# Patient Record
Sex: FEMALE | Race: WHITE
[De-identification: names, ages, dates, MRNs, and addresses within clinical notes are randomized per-mention and may not be internally consistent; named-entity substitution may affect disease eponyms.]

---

## 2018-04-10 ENCOUNTER — HOSPITAL ENCOUNTER (EMERGENCY)
Dept: HOSPITAL 31 - C.ER | Age: 58
Discharge: HOME | End: 2018-04-10
Payer: COMMERCIAL

## 2018-04-10 VITALS — OXYGEN SATURATION: 97 %

## 2018-04-10 VITALS
DIASTOLIC BLOOD PRESSURE: 81 MMHG | TEMPERATURE: 98.1 F | HEART RATE: 68 BPM | SYSTOLIC BLOOD PRESSURE: 113 MMHG | RESPIRATION RATE: 20 BRPM

## 2018-04-10 VITALS — BODY MASS INDEX: 30.4 KG/M2

## 2018-04-10 DIAGNOSIS — S46.911A: Primary | ICD-10-CM
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
## 2018-04-10 NOTE — C.PDOC
History Of Present Illness





<Dimple Smith DO - Last Filed: 04/10/18 16:39>





<Du Sheriff DO - Last Filed: 04/10/18 17:45>





Patient is a 57 year old female with PMHx of gastritis who presents to the ED 

with complaint of right arm pain since Thursday night.  Patient denies trauma 

or injury to her shoulder or arm.  She complains of right hand pain, weakness, 

and numbness.  She states her pain was 8/10 yesterday, 7/10 today.  She states 

she took ibuprofen and naproxen yesterday without relief.   (Dimple Smith DO)


History Per: Patient


Onset/Duration Of Symptoms: Days


Current Symptoms Are (Timing): Still Present


Severity: Moderate


Pain Scale Rating Of: 7





<Dimple Smith DO - Last Filed: 04/10/18 16:39>





<Du Sheriff DO - Last Filed: 04/10/18 17:45>


Chief Complaint (Nursing): Upper Extremity Problem/Injury





Past Medical History





- Medical History


PMH: Bronchitis, Gastritis


Family History: States: Unknown Family Hx





- Social History


Hx Alcohol Use: No


Hx Substance Use: No





- Immunization History


Hx Tetanus Toxoid Vaccination: No


Hx Influenza Vaccination: No


Hx Pneumococcal Vaccination: No





<Dimple Smith DO - Last Filed: 04/10/18 16:39>


Vital Signs: 





 Last Vital Signs











Temp  98.1 F   04/10/18 11:02


 


Pulse  68   04/10/18 11:02


 


Resp  20   04/10/18 11:02


 


BP  113/81   04/10/18 11:02


 


Pulse Ox  97   04/10/18 16:40














Review Of Systems


Constitutional: Negative for: Fever, Chills


Eyes: Negative for: Pain


ENT: Negative for: Ear Pain


Cardiovascular: Negative for: Chest Pain, Palpitations


Respiratory: Negative for: Cough, Shortness of Breath


Gastrointestinal: Negative for: Nausea, Vomiting, Abdominal Pain


Musculoskeletal: Positive for: Shoulder Pain, Arm Pain, Hand Pain.  Negative for

: Neck Pain


Neurological: Positive for: Numbness (right arm)





<Dimple Smith DO - Last Filed: 04/10/18 16:39>





Physical Exam





- Physical Exam


Appears: No Acute Distress


Skin: Warm, Other (left neck skin tag about 1cm in diameter)


Head: Atraumatic, Normacephalic


Eye(s): bilateral: EOMI


Nose: Normal


Oral Mucosa: Moist


Tongue: Normal Appearing


Lips: Normal Appearing


Neck: Normal ROM, No Midline Cervical Tenderness, No Paracervical Tenderness, 

Supple


Chest: Symmetrical


Cardiovascular: Rhythm Regular


Respiratory: Normal Breath Sounds


Gastrointestinal/Abdominal: Bowel Sounds, Soft, No Tenderness


Extremity: No Normal ROM (right shoulder ROM reduced due to pain)


Extremity: Bilateral: Other (5/5 b/l hand  strength)


Neurological/Psych: Oriented x3, Normal Cranial Nerves, Normal Motor, No Normal 

Sensation (increased sensitivity right bicep)





<Dimple Smith DO - Last Filed: 04/10/18 16:39>





ED Course And Treatment


O2 Sat by Pulse Oximetry: 97





<Dimple Smith DO - Last Filed: 04/10/18 16:39>





Disposition





- Disposition


Disposition Time: 11:00





<Dimple Smith DO - Last Filed: 04/10/18 16:39>





- Disposition


Disposition Time: 09:45





<Du Sheriff DO - Last Filed: 04/10/18 17:45>





- Disposition


Referrals: 


[facility] [Outside]


[facility] [Outside]


Disposition: HOME/ ROUTINE


Condition: GOOD


Additional Instructions: 





Thank you for letting us take care of you today. The emergency medical care you 

received today was directed at your acute symptoms. If you were prescribed any 

medication, please fill it and take as directed. It may take several days for 

your symptoms to resolve. Return to the Emergency Department if your symptoms 

worsen, do not improve, or if you have any other problems.





Please contact your doctor or call one of the physicians/clinics you have been 

referred to that are listed on the Patient Visit Information form that is 

included in your discharge packet. Bring any paperwork you were given at 

discharge with you along with any medications you are taking to your follow up 

visit. Our treatment cannot replace ongoing medical care by a primary care 

provider (PCP) outside of the emergency department.





Thank you for allowing the [facility] team to be part of your care today.














Follow up with the clinic this week for re-evaluation and further management.


Prescriptions: 


Ibuprofen [Motrin] 600 mg PO Q6 PRN #20 tab


 PRN Reason: Pain, Moderate (4-7)


predniSONE [Prednisone] 40 mg PO DAILY #10 tab


Instructions:  Shoulder Sprain


Forms:  Work Excuse





- Clinical Impression


Clinical Impression: 


 Muscle strain








- PA / NP / Resident Statement


ASHWIN has reviewed & agrees with the documentation as recorded.


MD/ has examined the patient and agrees with the treatment plan.





<Dimple Smith DO - Last Filed: 04/10/18 16:39>





- PA / NP / Resident Statement


AHSWIN has reviewed & agrees with the documentation as recorded.


ASHWIN has examined the patient and agrees with the treatment plan.





<Du Sheriff DO - Last Filed: 04/10/18 17:45>

## 2018-04-10 NOTE — RAD
PROCEDURE:  Radiographs of the Right Shoulder



HISTORY:

right shoulder pain, numbness







COMPARISON:

No prior.



FINDINGS:



BONES:

Normal. No fracture.



JOINTS:

Glenohumeral osteoarthritis.  Minimal acromioclavicular degenerative 

arthritis.



SOFT TISSUES:

Normal.



OTHER FINDINGS:

None.



IMPRESSION:

No acute fracture. Right glenohumeral osteoarthritis.

## 2019-03-31 ENCOUNTER — HOSPITAL ENCOUNTER (OUTPATIENT)
Dept: HOSPITAL 31 - C.ER | Age: 59
Setting detail: OBSERVATION
LOS: 1 days | Discharge: HOME | End: 2019-04-01
Attending: FAMILY MEDICINE | Admitting: FAMILY MEDICINE
Payer: MEDICAID

## 2019-03-31 VITALS — BODY MASS INDEX: 29.5 KG/M2

## 2019-03-31 VITALS — RESPIRATION RATE: 20 BRPM

## 2019-03-31 DIAGNOSIS — F17.200: Primary | ICD-10-CM

## 2019-03-31 DIAGNOSIS — J18.9: ICD-10-CM

## 2019-03-31 DIAGNOSIS — K29.70: ICD-10-CM

## 2019-03-31 LAB
ALBUMIN SERPL-MCNC: 4.4 {NULL, G/DL} (ref 3.5–5)
ALBUMIN/GLOB SERPL: 1.6 {NULL, NULL} (ref 1–2.1)
ALT SERPL-CCNC: < 6 {NULL, U/L} (ref 9–52)
AST SERPL-CCNC: 18 {NULL, U/L} (ref 14–36)
BASOPHILS # BLD AUTO: 0.1 {NULL, K/UL} (ref 0–0.2)
BASOPHILS NFR BLD: 0.7 {NULL, %} (ref 0–2)
BILIRUB UR-MCNC: NEGATIVE {NULL, NULL}
BUN SERPL-MCNC: 10 {NULL, MG/DL} (ref 7–17)
CALCIUM SERPL-MCNC: 9.5 {NULL, MG/DL} (ref 8.6–10.4)
CK MB SERPL-MCNC: 0.64 {NULL, NG/ML} (ref 0–3.38)
EOSINOPHIL # BLD AUTO: 0.1 {NULL, K/UL} (ref 0–0.7)
EOSINOPHIL NFR BLD: 0.9 {NULL, %} (ref 0–4)
ERYTHROCYTE [DISTWIDTH] IN BLOOD BY AUTOMATED COUNT: 13.2 {NULL, %} (ref 11.5–14.5)
GFR NON-AFRICAN AMERICAN: > 60 {NULL, NULL}
GLUCOSE UR STRIP-MCNC: NORMAL {NULL, MG/DL}
HDLC SERPL-MCNC: 56 {NULL, MG/DL} (ref 30–70)
HGB BLD-MCNC: 14.1 {NULL, G/DL} (ref 11–16)
LDLC SERPL-MCNC: 103 {NULL, MG/DL} (ref 0–129)
LEUKOCYTE ESTERASE UR-ACNC: (no result) {NULL, LEU/UL}
LIPASE: 65 {NULL, U/L} (ref 23–300)
LYMPHOCYTES # BLD AUTO: 2.4 {NULL, K/UL} (ref 1–4.3)
LYMPHOCYTES NFR BLD AUTO: 21.9 {NULL, %} (ref 20–40)
MCH RBC QN AUTO: 31.1 {NULL, PG} (ref 27–31)
MCHC RBC AUTO-ENTMCNC: 34.5 {NULL, G/DL} (ref 33–37)
MCV RBC AUTO: 90 {NULL, FL} (ref 81–99)
MONOCYTES # BLD: 1.1 {NULL, K/UL} (ref 0–0.8)
MONOCYTES NFR BLD: 10.2 {NULL, %} (ref 0–10)
NEUTROPHILS # BLD: 7.2 {NULL, K/UL} (ref 1.8–7)
NEUTROPHILS NFR BLD AUTO: 66.3 {NULL, %} (ref 50–75)
NRBC BLD AUTO-RTO: 0 {NULL, %} (ref 0–2)
PH UR STRIP: 5 {NULL, NULL} (ref 5–8)
PLATELET # BLD: 238 {NULL, K/UL} (ref 130–400)
PMV BLD AUTO: 9.4 {NULL, FL} (ref 7.2–11.7)
PROT UR STRIP-MCNC: NEGATIVE {NULL, MG/DL}
RBC # BLD AUTO: 4.55 {NULL, MIL/UL} (ref 3.8–5.2)
RBC # UR STRIP: NEGATIVE {NULL, NULL}
SP GR UR STRIP: 1.02 {NULL, NULL} (ref 1–1.03)
SQUAMOUS EPITHIAL: 1 {NULL, /HPF} (ref 0–5)
UROBILINOGEN UR-MCNC: 2 {NULL, MG/DL} (ref 0.2–1)
WBC # BLD AUTO: 10.8 {NULL, K/UL} (ref 4.8–10.8)

## 2019-03-31 PROCEDURE — 36415 COLL VENOUS BLD VENIPUNCTURE: CPT

## 2019-03-31 PROCEDURE — 94640 AIRWAY INHALATION TREATMENT: CPT

## 2019-03-31 PROCEDURE — 84443 ASSAY THYROID STIM HORMONE: CPT

## 2019-03-31 PROCEDURE — 96366 THER/PROPH/DIAG IV INF ADDON: CPT

## 2019-03-31 PROCEDURE — 83880 ASSAY OF NATRIURETIC PEPTIDE: CPT

## 2019-03-31 PROCEDURE — 80061 LIPID PANEL: CPT

## 2019-03-31 PROCEDURE — 93005 ELECTROCARDIOGRAM TRACING: CPT

## 2019-03-31 PROCEDURE — 71046 X-RAY EXAM CHEST 2 VIEWS: CPT

## 2019-03-31 PROCEDURE — 87040 BLOOD CULTURE FOR BACTERIA: CPT

## 2019-03-31 PROCEDURE — 96375 TX/PRO/DX INJ NEW DRUG ADDON: CPT

## 2019-03-31 PROCEDURE — 80053 COMPREHEN METABOLIC PANEL: CPT

## 2019-03-31 PROCEDURE — 96372 THER/PROPH/DIAG INJ SC/IM: CPT

## 2019-03-31 PROCEDURE — 85025 COMPLETE CBC W/AUTO DIFF WBC: CPT

## 2019-03-31 PROCEDURE — 99285 EMERGENCY DEPT VISIT HI MDM: CPT

## 2019-03-31 PROCEDURE — 84484 ASSAY OF TROPONIN QUANT: CPT

## 2019-03-31 PROCEDURE — 81001 URINALYSIS AUTO W/SCOPE: CPT

## 2019-03-31 PROCEDURE — 85378 FIBRIN DEGRADE SEMIQUANT: CPT

## 2019-03-31 PROCEDURE — 96365 THER/PROPH/DIAG IV INF INIT: CPT

## 2019-03-31 PROCEDURE — 83036 HEMOGLOBIN GLYCOSYLATED A1C: CPT

## 2019-03-31 PROCEDURE — 83690 ASSAY OF LIPASE: CPT

## 2019-03-31 PROCEDURE — 71275 CT ANGIOGRAPHY CHEST: CPT

## 2019-03-31 PROCEDURE — 93306 TTE W/DOPPLER COMPLETE: CPT

## 2019-03-31 RX ADMIN — IPRATROPIUM BROMIDE AND ALBUTEROL SULFATE SCH: .5; 3 SOLUTION RESPIRATORY (INHALATION) at 20:38

## 2019-03-31 NOTE — CP.PCM.HP
<Sunny Helton - Last Filed: 03/31/19 16:00>





History of Present Illness





- History of Present Illness


History of Present Illness: 





58 year old female with a past medical history of pulmonic stenosis, ovarian 

cyst, and fibroids who presents to the hospital after reporting chest pain 

located on her left ribs that began on Friday. She states the pain is sharp in 

nature and rates it a 6/10 in severity. Patient states the pain was so bad at 

times it woke her from her sleep the past two nights.  Patient denies taking 

anything for the pain because she didn't know what she should take.  She also is

reporting generalized fatigue and non-productive cough in conjunction with the 

presenting symptoms. Patient denies any fevers, chills, headaches, dizziness, 

changes in vision, abdominal pain, constipation, diarrhea, or any other 

complaints.





PMD: Denies





Medical history: pulmonic stenosis, ovarian cyst, fibroids


Allergies: PCN, vancomycin


Surgical history: Hysterectomy, ? pulmonic valve repair


Social history: 1/2-1 ppd x 20 years. Social drinker. Denies illicit drug use. 

Patient recently relocated from [place] to help her daughter take care of her 

children.





Cardiac history: Does report  echocardiogram in the past. Unknown of results. 

Denies cardiac catherization or stress test.





Present on Admission





- Present on Admission


Any Indicators Present on Admission: No





Review of Systems





- Constitutional


Constitutional: Fatigue.  absent: Anorexia, Chills, Fever, Frequent Falls, 

Malaise, Night Sweats, Weakness





- EENT


Eyes: absent: Blurred Vision, Diplopia, Itchy Eyes, Loss of Peripheral Vision, 

Sees Flashes, Loss of Vision


Ears: absent: Ear Discharge, Dizziness


Nose/Mouth/Throat: absent: Nasal Congestion, Nasal Trauma, Nose Pain, Bleeding 

Gums, Dysphagia, Mouth Pain, Facial Pain





- Cardiovascular


Cardiovascular: Chest Pain.  absent: Claudication, Irregular Heart Rhythm, Leg 

Edema, Palpitations, Radiating Pain, Syncope





- Respiratory


Respiratory: Cough.  absent: Dyspnea, Hemoptysis, Stridor, Pain on Inspiration, 

Change in Mucous Color





- Gastrointestinal


Gastrointestinal: absent: Belching, Dyspepsia, Dysphagia, Fecal Incontinence, 

Heartburn, Melena, Nausea





- Genitourinary


Genitourinary: absent: Change in Urinary Stream, Pyuria, Nocturia, Urinary 

Hesitance





- Musculoskeletal


Musculoskeletal: absent: Arthralgias, Atrophy, Myalgias, Neck Pain, Tingling





- Integumentary


Integumentary: absent: Bleeding Lesions, Changing Lesions, Lesions, Rash, 

Striae, Swelling





- Neurological


Neurological: absent: Abnormal Hearing, Disequilibrium, Dizziness, Focal 

Weakness, Restless Legs, Tremor, Vertigo, Weakness





- Psychiatric


Psychiatric: absent: Confusion, Depression, Panic Attacks, Suicidal Ideation





- Endocrine


Endocrine: absent: Polydipsia, Polyphagia, Polyuria





- Hematologic/Lymphatic


Hematologic: absent: Easy Bleeding, Easy Bruising





Past Patient History





- Infectious Disease


Hx of Infectious Diseases: None





- Past Social History


Smoking Status: Heavy Smoker > 10 Cigarettes Daily





- PULMONARY


Hx Bronchitis: Yes





- MUSCULOSKELETAL/RHEUMATOLOGICAL


Other/Comment: Vertigo





- GASTROINTESTINAL


Hx Gastritis: Yes





- PSYCHIATRIC


Hx Substance Use: No





- SURGICAL HISTORY


Hx Hysterectomy: Yes


Hx Open Heart Surgery: Yes (age 1)


Other/Comment: CYST REMOVAL AS PER PATIENT





- ANESTHESIA


Hx Anesthesia: Yes


Hx Anesthesia Reactions: No





Meds


Allergies/Adverse Reactions: 


                                    Allergies











Allergy/AdvReac Type Severity Reaction Status Date / Time


 


Penicillins Allergy   Verified 03/31/19 10:20


 


vancomycin Allergy  SHORTNESS Verified 03/31/19 10:20





   OF BREATH  














Physical Exam





- Head Exam


Head Exam: ATRAUMATIC, NORMAL INSPECTION, NORMOCEPHALIC





- Eye Exam


Eye Exam: EOMI, Normal appearance, PERRL.  absent: Periorbital tenderness


Pupil Exam: NORMAL ACCOMODATION, PERRL.  absent: Irregular





- ENT Exam


ENT Exam: Mucous Membranes Moist, Normal Oropharynx





- Respiratory Exam


Respiratory Exam: Clear to Auscultation Bilateral, NORMAL BREATHING PATTERN.  

absent: Prolonged Expiratory Phase, Respiratory Distress





- Cardiovascular Exam


Cardiovascular Exam: REGULAR RHYTHM, RRR, +S1, +S2.  absent: Rubs





- GI/Abdominal Exam


GI & Abdominal Exam: Normal Bowel Sounds, Soft.  absent: Tenderness





- Back Exam


Back exam: NORMAL INSPECTION.  absent: CVA tenderness (L), CVA tenderness (R), 

paraspinal tenderness





- Neurological Exam


Neurological exam: Alert, CN II-XII Intact, Oriented x3





- Psychiatric Exam


Psychiatric exam: Normal Affect, Normal Mood





- Skin


Skin Exam: Dry, Intact, Normal Color





Results





- Vital Signs


Recent Vital Signs: 





                                Last Vital Signs











Temp  99.0 F   03/31/19 11:22


 


Pulse  90   03/31/19 10:21


 


Resp  20   03/31/19 10:21


 


BP  119/82   03/31/19 10:21


 


Pulse Ox  96   03/31/19 14:11














- Labs


Result Diagrams: 


                                 03/31/19 11:32





                                 03/31/19 11:32


Labs: 





                         Laboratory Results - last 24 hr











  03/31/19 03/31/19 03/31/19





  11:32 11:32 11:32


 


WBC  10.8  D  


 


RBC  4.55  


 


Hgb  14.1  


 


Hct  41.0  


 


MCV  90.0  


 


MCH  31.1 H  


 


MCHC  34.5  


 


RDW  13.2  


 


Plt Count  238  


 


MPV  9.4  


 


Neut % (Auto)  66.3  


 


Lymph % (Auto)  21.9  


 


Mono % (Auto)  10.2 H  


 


Eos % (Auto)  0.9  


 


Baso % (Auto)  0.7  


 


Neut # (Auto)  7.2 H  


 


Lymph # (Auto)  2.4  


 


Mono # (Auto)  1.1 H  


 


Eos # (Auto)  0.1  


 


Baso # (Auto)  0.1  


 


D-Dimer, Quantitative   621 H 


 


Sodium    135


 


Potassium    4.1


 


Chloride    105


 


Carbon Dioxide    23


 


Anion Gap    11


 


BUN    10


 


Creatinine    0.6 L


 


Est GFR ( Amer)    > 60


 


Est GFR (Non-Af Amer)    > 60


 


Random Glucose    92


 


Calcium    9.5


 


Total Bilirubin    0.5


 


AST    18


 


ALT    < 6 L D


 


Alkaline Phosphatase    82


 


Troponin I    < 0.0120


 


Total Protein    7.1


 


Albumin    4.4


 


Globulin    2.8


 


Albumin/Globulin Ratio    1.6


 


Lipase    65


 


Urine Color   


 


Urine Clarity   


 


Urine pH   


 


Ur Specific Gravity   


 


Urine Protein   


 


Urine Glucose (UA)   


 


Urine Ketones   


 


Urine Blood   


 


Urine Nitrate   


 


Urine Bilirubin   


 


Urine Urobilinogen   


 


Ur Leukocyte Esterase   


 


Urine WBC (Auto)   


 


Urine RBC (Auto)   


 


Ur Squamous Epith Cells   














  03/31/19





  11:32


 


WBC 


 


RBC 


 


Hgb 


 


Hct 


 


MCV 


 


MCH 


 


MCHC 


 


RDW 


 


Plt Count 


 


MPV 


 


Neut % (Auto) 


 


Lymph % (Auto) 


 


Mono % (Auto) 


 


Eos % (Auto) 


 


Baso % (Auto) 


 


Neut # (Auto) 


 


Lymph # (Auto) 


 


Mono # (Auto) 


 


Eos # (Auto) 


 


Baso # (Auto) 


 


D-Dimer, Quantitative 


 


Sodium 


 


Potassium 


 


Chloride 


 


Carbon Dioxide 


 


Anion Gap 


 


BUN 


 


Creatinine 


 


Est GFR ( Amer) 


 


Est GFR (Non-Af Amer) 


 


Random Glucose 


 


Calcium 


 


Total Bilirubin 


 


AST 


 


ALT 


 


Alkaline Phosphatase 


 


Troponin I 


 


Total Protein 


 


Albumin 


 


Globulin 


 


Albumin/Globulin Ratio 


 


Lipase 


 


Urine Color  Yellow


 


Urine Clarity  Hazy


 


Urine pH  5.0


 


Ur Specific Gravity  1.023


 


Urine Protein  Negative


 


Urine Glucose (UA)  Normal


 


Urine Ketones  Trace


 


Urine Blood  Negative


 


Urine Nitrate  Negative


 


Urine Bilirubin  Negative


 


Urine Urobilinogen  2.0 H


 


Ur Leukocyte Esterase  Trace


 


Urine WBC (Auto)  1


 


Urine RBC (Auto)  1


 


Ur Squamous Epith Cells  1














Assessment & Plan





- Assessment and Plan (Free Text)


Assessment: 





58 year old female with a past medical history of pulmonic stenosis, fibroids 

and ovarian cyst presents with chest pain for the past three days.


Plan: 





1.Chest pain r/o acs


EKG:Normal sinus rhythm at 86bpm, incomplete RBBB, borderline EKG


Aspirin 325mg PO STAT


Troponin (-)x1. Will trend troponins


TSH ordered. Will f/u with results


Hemoglobin A1C ordered. Will f/u with results


Lipid panel ordered .Will f/u with results.





2.Pneumonia


CXR:Prominent reticular opacities at the lung bases more on the left.


CTA:No evidence of acute pulmonary embolus.


Markedly enlarged main pulmonary artery is again noted.


Cardiomegaly and enlargement of the right heart.


Airspace consolidation at the left lower lobe associated with small left pleural

effusion suspicious for pneumonia.


   Medications:


   Moxifloxacin 400mg IVPB DAILY





3.Elevated d-dimer


d-dimer 621 on admission


CTA:CTA:No evidence of acute pulmonary embolus.


Markedly enlarged main pulmonary artery is again noted.


Cardiomegaly and enlargement of the right heart.


Airspace consolidation at the left lower lobe associated with small left pleural

effusion suspicious for pneumonia.





4.Active mole on neck


-Will refer to [facility] or [facility] for further workup.





5. Enlarged pulmonary artery


-Echocardiogram ordered. Will f/u with results.








ppx


-Heparin


-GI ppx not indicated at this time.





Plan discussed with Attending Dr. Chacko.





Sunny Helton, PGY-2








<Emily Chacko - Last Filed: 03/31/19 16:51>





Results





- Vital Signs


Recent Vital Signs: 





                                Last Vital Signs











Temp  98.2 F   03/31/19 15:39


 


Pulse  73   03/31/19 15:39


 


Resp  20   03/31/19 15:39


 


BP  125/83   03/31/19 15:39


 


Pulse Ox  97   03/31/19 15:39














- Labs


Result Diagrams: 


                                 03/31/19 11:32





                                 03/31/19 11:32


Labs: 





                         Laboratory Results - last 24 hr











  03/31/19 03/31/19 03/31/19





  11:32 11:32 11:32


 


WBC  10.8  D  


 


RBC  4.55  


 


Hgb  14.1  


 


Hct  41.0  


 


MCV  90.0  


 


MCH  31.1 H  


 


MCHC  34.5  


 


RDW  13.2  


 


Plt Count  238  


 


MPV  9.4  


 


Neut % (Auto)  66.3  


 


Lymph % (Auto)  21.9  


 


Mono % (Auto)  10.2 H  


 


Eos % (Auto)  0.9  


 


Baso % (Auto)  0.7  


 


Neut # (Auto)  7.2 H  


 


Lymph # (Auto)  2.4  


 


Mono # (Auto)  1.1 H  


 


Eos # (Auto)  0.1  


 


Baso # (Auto)  0.1  


 


D-Dimer, Quantitative   621 H 


 


Sodium    135


 


Potassium    4.1


 


Chloride    105


 


Carbon Dioxide    23


 


Anion Gap    11


 


BUN    10


 


Creatinine    0.6 L


 


Est GFR ( Amer)    > 60


 


Est GFR (Non-Af Amer)    > 60


 


Random Glucose    92


 


Calcium    9.5


 


Total Bilirubin    0.5


 


AST    18


 


ALT    < 6 L D


 


Alkaline Phosphatase    82


 


Troponin I    < 0.0120


 


NT-Pro-B Natriuret Pep   


 


Total Protein    7.1


 


Albumin    4.4


 


Globulin    2.8


 


Albumin/Globulin Ratio    1.6


 


Triglycerides   


 


Cholesterol   


 


LDL Cholesterol Direct   


 


HDL Cholesterol   


 


Lipase    65


 


TSH 3rd Generation   


 


Urine Color   


 


Urine Clarity   


 


Urine pH   


 


Ur Specific Gravity   


 


Urine Protein   


 


Urine Glucose (UA)   


 


Urine Ketones   


 


Urine Blood   


 


Urine Nitrate   


 


Urine Bilirubin   


 


Urine Urobilinogen   


 


Ur Leukocyte Esterase   


 


Urine WBC (Auto)   


 


Urine RBC (Auto)   


 


Ur Squamous Epith Cells   














  03/31/19 03/31/19 03/31/19





  11:32 14:18 15:36


 


WBC   


 


RBC   


 


Hgb   


 


Hct   


 


MCV   


 


MCH   


 


MCHC   


 


RDW   


 


Plt Count   


 


MPV   


 


Neut % (Auto)   


 


Lymph % (Auto)   


 


Mono % (Auto)   


 


Eos % (Auto)   


 


Baso % (Auto)   


 


Neut # (Auto)   


 


Lymph # (Auto)   


 


Mono # (Auto)   


 


Eos # (Auto)   


 


Baso # (Auto)   


 


D-Dimer, Quantitative   


 


Sodium   


 


Potassium   


 


Chloride   


 


Carbon Dioxide   


 


Anion Gap   


 


BUN   


 


Creatinine   


 


Est GFR ( Amer)   


 


Est GFR (Non-Af Amer)   


 


Random Glucose   


 


Calcium   


 


Total Bilirubin   


 


AST   


 


ALT   


 


Alkaline Phosphatase   


 


Troponin I   


 


NT-Pro-B Natriuret Pep   295 


 


Total Protein   


 


Albumin   


 


Globulin   


 


Albumin/Globulin Ratio   


 


Triglycerides    97


 


Cholesterol    170


 


LDL Cholesterol Direct    103


 


HDL Cholesterol    56


 


Lipase   


 


TSH 3rd Generation    1.08


 


Urine Color  Yellow  


 


Urine Clarity  Hazy  


 


Urine pH  5.0  


 


Ur Specific Gravity  1.023  


 


Urine Protein  Negative  


 


Urine Glucose (UA)  Normal  


 


Urine Ketones  Trace  


 


Urine Blood  Negative  


 


Urine Nitrate  Negative  


 


Urine Bilirubin  Negative  


 


Urine Urobilinogen  2.0 H  


 


Ur Leukocyte Esterase  Trace  


 


Urine WBC (Auto)  1  


 


Urine RBC (Auto)  1  


 


Ur Squamous Epith Cells  1  














Attending/Attestation





- Attestation


I have personally seen and examined this patient.: Yes


I have fully participated in the care of the patient.: Yes


I have reviewed all pertinent clinical information: Yes


Notes (Text): 


seen and examined . Patient is from [place]. She is here to help her daughter. 

Has h/o pulmonary valve repair when she was a child.


no significant other medical history


Came for pleuritic type chest pain. CTA negative for PE.has L lower lobe 

pneumonia.Patient has a mole on her left neck,denies sob,no fever


we will observe tonight,do troponin and tele monitor


avelox for pneumonia


CT shows enlarged pulmonary artery and cardiomegaly-we will do an Echo

## 2019-03-31 NOTE — CT
Date of service: 



03/31/2019



PROCEDURE:  CT Chest with contrast (Pulmonary Angiogram)



HISTORY:

left chest/lung pain, elevated ddimer, smoker



COMPARISON:

Comparison is made to the previous study dated 05/19/2013



TECHNIQUE:

Axial computed tomography images were obtained of the chest in the 

pulmonary arterial phase of enhancement. Coronal and sagittal 

reformatted images were created and reviewed.



Intravenous contrast dose: 100 mL of Visipaque 320 intravenously.



Radiation dose:



Total exam DLP = 621.72 mGy-cm.



This CT exam was performed using one or more of the following dose 

reduction techniques: Automated exposure control, adjustment of the 

mA and/or kV according to patient size, and/or use of iterative 

reconstruction technique.



FINDINGS:



PULMONARY ARTERIES:

No evidence of filling defect in the visualized pulmonary arteries to 

suggest acute pulmonary embolus.  Again noted is markedly enlarged 

main pulmonary artery suggestive of underline severe pulmonary 

hypertension. 



AORTA:

No acute findings. No thoracic aortic aneurysm. Small punctate 

atherosclerotic calcification at the aorta are noted.



LUNGS:

Airspace consolidation noted at the left lung lower lobe suspicious 

for pneumonia or less likely atelectasis. 



PLEURAL SPACES:

Small left pleural effusion is noted. 



HEART:

The heart is mildly to moderately enlarged.  The right heart is 

enlarged. 



LYMPH NODES:

No lymphadenopathy.



BONES, CHEST WALL:

Unremarkable. No fracture or destructive lesion .  The patient is 

again status post bilateral breast implants.



OTHER FINDINGS:

Again noted is large hiatus hernia in the retrocardiac space.



There is low-attenuation lesion at the anterior aspect of the liver 

measures 1.6 centimeter has increased in size since the previous 

exam. 



Again noted are low-attenuation nodule/small masses at the bilateral 

adrenal glands which have increased in size since the previous exam. 



IMPRESSION:

No evidence of acute pulmonary embolus.



Markedly enlarged main pulmonary artery is again noted.



Cardiomegaly and enlargement of the right heart.



Airspace consolidation at the left lower lobe associated with small 

left pleural effusion suspicious for pneumonia.



Additional findings as discussed above.

## 2019-03-31 NOTE — C.PDOC
History Of Present Illness


48-year-old female with a history of Pulmonic valve repair at age 1 presents to 

the emergency department with complaints of sharp intermittent pain radiating 

from her epigastric area to her left axilla, feels like it goes under her ribs. 

. Patient states that when the pain is bad she is short of breath but not 

diaphoretic. Patient also reports a cough and states that she is a smoker. 

Patient reports nausea but denies leg pain or swelling. Patient states that she 

tried Mylanta with no improvement.denies any skin rash. 


Time Seen by Provider: 03/31/19 10:31


Chief Complaint (Nursing): Abdominal Pain


History Per: Patient


History/Exam Limitations: no limitations


Onset/Duration Of Symptoms: Days


Current Symptoms Are (Timing): Still Present


Location Of Pain/Discomfort: Epigastric, Other


Quality Of Discomfort: "Pain"


Associated Symptoms: Nausea, Other (shortness of breath, cough).  denies: 

Vomiting





Past Medical History


Reviewed: Historical Data, Nursing Documentation, Vital Signs


Vital Signs: 





                                Last Vital Signs











Temp  90 F L  03/31/19 10:21


 


Pulse  90   03/31/19 10:21


 


Resp  20   03/31/19 10:21


 


BP  119/82   03/31/19 10:21


 


Pulse Ox  96   03/31/19 10:21














- Medical History


PMH: Bronchitis, Gastritis


Surgical History: No Surg Hx


Family History: States: No Known Family Hx





- Social History


Hx Alcohol Use: No


Hx Substance Use: No





- Immunization History


Hx Tetanus Toxoid Vaccination: No


Hx Influenza Vaccination: No


Hx Pneumococcal Vaccination: No





Review Of Systems


Constitutional: Negative for: Fever, Chills, Sweats


Cardiovascular: Positive for: Chest Pain.  Negative for: Palpitations, Edema, 

Light Headedness


Respiratory: Positive for: Cough, Shortness of Breath


Gastrointestinal: Positive for: Nausea, Abdominal Pain (epigastric tenderness). 

Negative for: Vomiting


Genitourinary: Negative for: Dysuria, Frequency


Musculoskeletal: Negative for: Leg Pain


Skin: Negative for: Rash


Neurological: Negative for: Weakness, Numbness, Headache





Physical Exam





- Physical Exam


Appears: Non-toxic, No Acute Distress


Skin: Warm, Dry, No Rash


Head: Atraumatic, Normacephalic


Eye(s): bilateral: Normal Inspection, PERRL, EOMI


Oral Mucosa: Moist


Neck: Normal, Supple


Chest: Symmetrical, No Tenderness


Cardiovascular: Rhythm Regular, No Murmur


Respiratory: Normal Breath Sounds, No Rales, No Rhonchi, No Wheezing


Gastrointestinal/Abdominal: Bowel Sounds, Soft, Tenderness (epigastric), No 

Distention, No Guarding, No Rebound


Extremity: Normal ROM, No Tenderness, No Pedal Edema, No Calf Tenderness


Pulses: Left Dorsalis Pedis: Normal, Right Dorsalis Pedis: Normal


Neurological/Psych: Oriented x3, Normal Speech, Normal Cognition





ED Course And Treatment





- Laboratory Results


Result Diagrams: 


                                 03/31/19 11:32





                                 03/31/19 11:32


Interpretation Of ECG: Normal sinus rhythm at 86bpm, incomplete RBBB, borderline

EKG


O2 Sat by Pulse Oximetry: 96 (RA)


Pulse Ox Interpretation: Normal





- Other Rad


  ** CXR


X-Ray: Viewed By Me, Read By Radiologist


Interpretation: IMPRESSION:  Prominent reticular opacities at the lung bases 

more on the left.





Medical Decision Making


Medical Decision Making: 





Plan:


EKG


Chemistry


Hematology


CXR


Pepcid 20mg IVP


Urinalysis





Old EKG from 5-19-13 reviewed, current EKG is unchanged from prior. 





1205 pt with elevated d-dimer, o2 saturation of 94-95 room air; will get chest 

cta to eval for pe.





1340  pt returned to ed from ct, now crying from pain in left axillary area from

movement of stretcher. pt points to area on left side, generalized tenderness to

palpation of left lateral chest area. no particular bony tenderness, no warmth 

or swelling noted, no crepitus palapted.  mild erythematous patch now seen 

distal to tender area, no vesicles noted.  chest cta reviewed; pt has 

cardiomegaly, enlarged pulmonary artery, hiatal hernia and left lung infiltrate.

 no pe noted, will give pt antibiotics and toradol and admit to Dr Best, 

discussed with her. 





Disposition


Discussed With .: Mi Best


Doctor Will See Patient In The: Hospital





- Disposition


Disposition: HOSPITALIZED


Disposition Time: 14:11


Condition: GOOD





- Clinical Impression


Clinical Impression: 


 Chest pain, Pneumonia








- PA / NP / Resident Statement


MD/DO has reviewed & agrees with the documentation as recorded.





- Scribe Statement


The provider has reviewed the documentation as recorded by the Scribe (Lukasz Espinosa)


All medical record entries made by the Scribe were at my direction and 

personally dictated by me. I have reviewed the chart and agree that the record 

accurately reflects my personal performance of the history, physical exam, 

medical decision making, and the department course for this patient. I have also

personally directed, reviewed, and agree with the discharge instructions and 

disposition.

## 2019-03-31 NOTE — RAD
Date of service: 



03/31/2019



HISTORY:

 chest pain 



COMPARISON:

Comparison is made with 05/19/2013



TECHNIQUE:

Chest PA and lateral views



FINDINGS:



LUNGS:

Prominent reticular opacities noted at the lung bases left more than 

right.  Otherwise no significant interval changes



PLEURA:

No significant pleural effusion identified. No pneumothorax apparent.



CARDIOVASCULAR:

No aortic atherosclerotic calcification present.



Normal cardiac size. No pulmonary vascular congestion. 



OSSEOUS STRUCTURES:

No significant abnormalities.



VISUALIZED UPPER ABDOMEN:

Normal.



OTHER FINDINGS:

None.



IMPRESSION:

Prominent reticular opacities at the lung bases more on the left.

## 2019-04-01 VITALS
SYSTOLIC BLOOD PRESSURE: 99 MMHG | DIASTOLIC BLOOD PRESSURE: 66 MMHG | TEMPERATURE: 98.1 F | HEART RATE: 83 BPM | OXYGEN SATURATION: 92 %

## 2019-04-01 LAB
ALBUMIN SERPL-MCNC: 3.9 {NULL, G/DL} (ref 3.5–5)
ALBUMIN/GLOB SERPL: 1.3 {NULL, NULL} (ref 1–2.1)
ALT SERPL-CCNC: 10 {NULL, U/L} (ref 9–52)
AST SERPL-CCNC: 15 {NULL, U/L} (ref 14–36)
BASOPHILS # BLD AUTO: 0 {NULL, K/UL} (ref 0–0.2)
BASOPHILS NFR BLD: 0.5 {NULL, %} (ref 0–2)
BUN SERPL-MCNC: 11 {NULL, MG/DL} (ref 7–17)
CALCIUM SERPL-MCNC: 9.2 {NULL, MG/DL} (ref 8.6–10.4)
CK MB SERPL-MCNC: 0.33 {NULL, NG/ML} (ref 0–3.38)
EOSINOPHIL # BLD AUTO: 0.1 {NULL, K/UL} (ref 0–0.7)
EOSINOPHIL NFR BLD: 0.9 {NULL, %} (ref 0–4)
ERYTHROCYTE [DISTWIDTH] IN BLOOD BY AUTOMATED COUNT: 12.9 {NULL, %} (ref 11.5–14.5)
GFR NON-AFRICAN AMERICAN: > 60 {NULL, NULL}
HGB BLD-MCNC: 13.9 {NULL, G/DL} (ref 11–16)
LYMPHOCYTES # BLD AUTO: 2 {NULL, K/UL} (ref 1–4.3)
LYMPHOCYTES NFR BLD AUTO: 23.3 {NULL, %} (ref 20–40)
MCH RBC QN AUTO: 31.1 {NULL, PG} (ref 27–31)
MCHC RBC AUTO-ENTMCNC: 34.4 {NULL, G/DL} (ref 33–37)
MCV RBC AUTO: 90.4 {NULL, FL} (ref 81–99)
MONOCYTES # BLD: 1 {NULL, K/UL} (ref 0–0.8)
MONOCYTES NFR BLD: 11.6 {NULL, %} (ref 0–10)
NEUTROPHILS # BLD: 5.4 {NULL, K/UL} (ref 1.8–7)
NEUTROPHILS NFR BLD AUTO: 63.7 {NULL, %} (ref 50–75)
NRBC BLD AUTO-RTO: 0 {NULL, %} (ref 0–2)
PLATELET # BLD: 249 {NULL, K/UL} (ref 130–400)
PMV BLD AUTO: 9.8 {NULL, FL} (ref 7.2–11.7)
RBC # BLD AUTO: 4.45 {NULL, MIL/UL} (ref 3.8–5.2)
WBC # BLD AUTO: 8.4 {NULL, K/UL} (ref 4.8–10.8)

## 2019-04-01 RX ADMIN — IPRATROPIUM BROMIDE AND ALBUTEROL SULFATE SCH ML: .5; 3 SOLUTION RESPIRATORY (INHALATION) at 07:47

## 2019-04-01 RX ADMIN — IPRATROPIUM BROMIDE AND ALBUTEROL SULFATE SCH ML: .5; 3 SOLUTION RESPIRATORY (INHALATION) at 13:15

## 2019-04-01 RX ADMIN — IPRATROPIUM BROMIDE AND ALBUTEROL SULFATE SCH ML: .5; 3 SOLUTION RESPIRATORY (INHALATION) at 01:31

## 2019-04-01 NOTE — CP.PCM.DIS
<Ron Christie - Last Filed: 04/01/19 15:36>





Provider





- Provider


Date of Admission: 


03/31/19 14:10





Attending physician: 


Mi Best DO





Time Spent in preparation of Discharge (in minutes): 35





Diagnosis





- Discharge Diagnosis


(1) Musculoskeletal chest pain


Status: Resolved   





(2) Pulmonary artery abnormality


Status: Chronic   





(3) H/O pulmonic valve repair


Status: Chronic   





(4) Pneumonia


Status: Acute   





Hospital Course





- Lab Results


Lab Results: 


                                  Micro Results





03/31/19 13:00   Blood-Venous   Blood Culture - Preliminary


                            NO GROWTH AFTER 24 HOURS


03/31/19 14:15   Blood-Venous   Blood Culture - Preliminary


                            NO GROWTH AFTER 24 HOURS





                             Most Recent Lab Values











WBC  8.4 K/uL (4.8-10.8)   04/01/19  07:40    


 


RBC  4.45 Mil/uL (3.80-5.20)   04/01/19  07:40    


 


Hgb  13.9 g/dL (11.0-16.0)   04/01/19  07:40    


 


Hct  40.3 % (34.0-47.0)   04/01/19  07:40    


 


MCV  90.4 fL (81.0-99.0)   04/01/19  07:40    


 


MCH  31.1 pg (27.0-31.0)  H  04/01/19  07:40    


 


MCHC  34.4 g/dL (33.0-37.0)   04/01/19  07:40    


 


RDW  12.9 % (11.5-14.5)   04/01/19  07:40    


 


Plt Count  249 K/uL (130-400)   04/01/19  07:40    


 


MPV  9.8 fL (7.2-11.7)   04/01/19  07:40    


 


Neut % (Auto)  63.7 % (50.0-75.0)   04/01/19  07:40    


 


Lymph % (Auto)  23.3 % (20.0-40.0)   04/01/19  07:40    


 


Mono % (Auto)  11.6 % (0.0-10.0)  H  04/01/19  07:40    


 


Eos % (Auto)  0.9 % (0.0-4.0)   04/01/19  07:40    


 


Baso % (Auto)  0.5 % (0.0-2.0)   04/01/19  07:40    


 


Neut # (Auto)  5.4 K/uL (1.8-7.0)   04/01/19  07:40    


 


Lymph # (Auto)  2.0 K/uL (1.0-4.3)   04/01/19  07:40    


 


Mono # (Auto)  1.0 K/uL (0.0-0.8)  H  04/01/19  07:40    


 


Eos # (Auto)  0.1 K/uL (0.0-0.7)   04/01/19  07:40    


 


Baso # (Auto)  0.0 K/uL (0.0-0.2)   04/01/19  07:40    


 


D-Dimer, Quantitative  621 ng/mlDDU (0-243)  H  03/31/19  11:32    


 


Sodium  136 mmol/L (132-148)   04/01/19  07:40    


 


Potassium  4.0 mmol/L (3.6-5.2)   04/01/19  07:40    


 


Chloride  108 mmol/L ()  H  04/01/19  07:40    


 


Carbon Dioxide  20 mmol/L (22-30)  L  04/01/19  07:40    


 


Anion Gap  13  (10-20)   04/01/19  07:40    


 


BUN  11 mg/dL (7-17)   04/01/19  07:40    


 


Creatinine  0.7 mg/dL (0.7-1.2)   04/01/19  07:40    


 


Est GFR ( Amer)  > 60   04/01/19  07:40    


 


Est GFR (Non-Af Amer)  > 60   04/01/19  07:40    


 


Random Glucose  116 mg/dL ()  H D 04/01/19  07:40    


 


Hemoglobin A1c  6.0 % (4.2-6.5)   03/31/19  15:36    


 


Calcium  9.2 mg/dl (8.6-10.4)   04/01/19  07:40    


 


Total Bilirubin  0.5 mg/dL (0.2-1.3)   04/01/19  07:40    


 


AST  15 U/L (14-36)   04/01/19  07:40    


 


ALT  10 U/L (9-52)   04/01/19  07:40    


 


Alkaline Phosphatase  87 U/L ()   04/01/19  07:40    


 


Total Creatine Kinase  62 U/L ()   04/01/19  03:24    


 


CK-MB (Mass)  0.33 ng/mL (0.0-3.38)   04/01/19  03:24    


 


Troponin I  < 0.0120 ng/mL (0.00-0.120)   04/01/19  03:24    


 


NT-Pro-B Natriuret Pep  295 pg/mL (0-900)   03/31/19  14:18    


 


Total Protein  6.9 g/dL (6.3-8.3)   04/01/19  07:40    


 


Albumin  3.9 g/dL (3.5-5.0)   04/01/19  07:40    


 


Globulin  3.0 gm/dL (2.2-3.9)   04/01/19  07:40    


 


Albumin/Globulin Ratio  1.3  (1.0-2.1)   04/01/19  07:40    


 


Triglycerides  97 mg/dL (0-149)   03/31/19  15:36    


 


Cholesterol  170 mg/dL (0-199)   03/31/19  15:36    


 


LDL Cholesterol Direct  103 mg/dL (0-129)   03/31/19  15:36    


 


HDL Cholesterol  56 mg/dL (30-70)   03/31/19  15:36    


 


Lipase  65 U/L ()   03/31/19  11:32    


 


TSH 3rd Generation  1.08 mIU/L (0.46-4.68)   03/31/19  15:36    


 


Urine Color  Yellow  (YELLOW)   03/31/19  11:32    


 


Urine Clarity  Hazy  (Clear)   03/31/19  11:32    


 


Urine pH  5.0  (5.0-8.0)   03/31/19  11:32    


 


Ur Specific Gravity  1.023  (1.003-1.030)   03/31/19  11:32    


 


Urine Protein  Negative mg/dL (NEGATIVE)   03/31/19  11:32    


 


Urine Glucose (UA)  Normal mg/dL (Normal)   03/31/19  11:32    


 


Urine Ketones  Trace mg/dL (NEGATIVE)   03/31/19  11:32    


 


Urine Blood  Negative  (NEGATIVE)   03/31/19  11:32    


 


Urine Nitrate  Negative  (NEGATIVE)   03/31/19  11:32    


 


Urine Bilirubin  Negative  (NEGATIVE)   03/31/19  11:32    


 


Urine Urobilinogen  2.0 mg/dL (0.2-1.0)  H  03/31/19  11:32    


 


Ur Leukocyte Esterase  Trace Elle/uL (Negative)   03/31/19  11:32    


 


Urine WBC (Auto)  1 /hpf (0-5)   03/31/19  11:32    


 


Urine RBC (Auto)  1 /hpf (0-3)   03/31/19  11:32    


 


Ur Squamous Epith Cells  1 /hpf (0-5)   03/31/19  11:32    














- Hospital Course


Hospital Course: 





On Admission: 


58 year old female with past medical history of pulmonic stenosis , ovarian 

cyst, fibroids who presents to the hospital after reporting chest pain located 

on her left ribs that began on Friday.  She states the pain is sharp in nature 

and rates it as a 6/10 in severity.  Patient states the pain was so bad at times

it woke her from her sleep the past 2 nights.  Patient denies taking anything 

for the pain because she didnt know what she should take.  She also reports 

generalized fatigue and non-productive cough in conjunction with the presenting 

symptoms.  Patient denies any fevers, chills, headaches, changes in vision, 

dizziness, abdominal pain, constipation diarrhea or other complaints.





Hosp course: 


EKG performed on admission showed an incomplete RBBB consistent with previous 

(5/19/2013) EKG findings.  Chest Xray showed prominent reticular opacities at 

the lung bases more on the left.  Labs drawn on admission showed, normal BNP, 

elevated D-dimer of 621.  A chest CTA was ordered and revealed a markedly 

enlarged main pulmonary artery with enlargement of the right heart, airspace 

consolidation at left lower lobe associated with small left pleural effusion 

suspicious for pneumonia. It revealed no evidence of acute pulmonary embolus.  

Pt started on moxifloxacin for CAP, duonebs, VTE ppx. Pt's musculoskeltal left 

rib pain was relieved with Tylenol and Toradol. Troponin x3 is normal.


An Echocardiogram was completed to f/u the CTA results demonstrating 

significantly enlarged main pulmonary artery.


Pending studies: Echocardiogram results still pending.  Patient will be 

contacted with results.





On discharge interview, Pt denies any complaints. Reports resolution of left rib

pain. Denies fever, chills, chest pain, sob, palpitations, abdominal pain, 

n/v/d, headache, dizziness, lightheadedness. 


Throughout hospitalization, pt is afebrile, without leukocytosis, dyspnea, 

tachycardia, or confusion. Pt will be discharge home with 5 day course of once 

daily moxifloxacin, and information on appropriate follow up for left 

pedunculated mole, and establishment of medical care.





This is a summary of the hospital course. Please see EMR for full details.





Discharge Exam





- Head Exam


Head Exam: ATRAUMATIC, NORMAL INSPECTION





- Eye Exam


Eye Exam: EOMI, Normal appearance





- ENT Exam


ENT Exam: Mucous Membranes Moist





- Respiratory Exam


Respiratory Exam: Clear to PA & Lateral.  absent: Chest Wall Tenderness, Rales, 

Rhonchi, Wheezes, Respiratory Distress





- Cardiovascular Exam


Cardiovascular Exam: REGULAR RHYTHM, +S1, +S2.  absent: Tachycardia, Irregular 

Rhythm





- GI/Abdominal Exam


GI & Abdominal Exam: Normal Bowel Sounds, Soft.  absent: Distended, Firm, 

Guarding, Rebound, Tenderness





- Extremities Exam


Extremities exam: normal capillary refill


Additional comments: 





NO calf tenderness, no pedal edema. 2+ pulses in bilateral upper and lower 

distal extremities





- Back Exam


Back exam: NORMAL INSPECTION.  absent: CVA tenderness (L), CVA tenderness (R)





- Neurological Exam


Neurological exam: Alert, Oriented x3





- Psychiatric Exam


Psychiatric exam: Normal Affect, Normal Mood





- Skin


Skin Exam: Dry, Normal Color, Warm


Additional comments: 





(+) approximately 4 cm mole to the left lateral upper neck with stalk, 

nontender, no signs of infection, homogenous color; chronic


(+) well health vertical scar to the right lateral sternum, nontender. no signs 

of infection.





Discharge Plan





- Discharge Medications


Prescriptions: 


Moxifloxacin HCl 400 mg PO DAILY #5 tab





- Follow Up Plan


Condition: GOOD


Disposition: HOME/ ROUTINE


Instructions:  Moxifloxacin (Systemic), Pneumonia, Adult (DC), Quitting Smoking


Additional Instructions: 


Pt is medically stable for discharge home as per Dr. Hyman





Pt should continue Moxifloxacin 400 mg one tab by mouth once daily at 8 am for 5

more days. Resume home mediations as previously prescribed. 





Pt should follow up with [facility] within 1 

week of discharge, April 8th at 9AM. They will follow up with your echocadiogram

results. Please continue to exercise and diet modifications as previously talked

about, including smoking cessation. You should call [facility] at 

[phone] to make an appointment, or you can follow up with the clinic and 

they will refer you to a dermatologist.





Should symptoms worsen, please head to the nearest Emergency Department for 

further evaluation.





Instructions explained to the pt, who understands and agrees with discharge 

plan.


Referrals: 


[facility] [Outside] - 04/08/19 9:00 am





<HymanNoel RIZO - Last Filed: 04/01/19 17:45>





Provider





- Provider


Date of Admission: 


03/31/19 14:10





Attending physician: 


Mi Best, 








Hospital Course





- Lab Results


Lab Results: 


                                  Micro Results





03/31/19 13:00   Blood-Venous   Blood Culture - Preliminary


                            NO GROWTH AFTER 24 HOURS


03/31/19 14:15   Blood-Venous   Blood Culture - Preliminary


                            NO GROWTH AFTER 24 HOURS





                             Most Recent Lab Values











WBC  8.4 K/uL (4.8-10.8)   04/01/19  07:40    


 


RBC  4.45 Mil/uL (3.80-5.20)   04/01/19  07:40    


 


Hgb  13.9 g/dL (11.0-16.0)   04/01/19  07:40    


 


Hct  40.3 % (34.0-47.0)   04/01/19  07:40    


 


MCV  90.4 fL (81.0-99.0)   04/01/19  07:40    


 


MCH  31.1 pg (27.0-31.0)  H  04/01/19  07:40    


 


MCHC  34.4 g/dL (33.0-37.0)   04/01/19  07:40    


 


RDW  12.9 % (11.5-14.5)   04/01/19  07:40    


 


Plt Count  249 K/uL (130-400)   04/01/19  07:40    


 


MPV  9.8 fL (7.2-11.7)   04/01/19  07:40    


 


Neut % (Auto)  63.7 % (50.0-75.0)   04/01/19  07:40    


 


Lymph % (Auto)  23.3 % (20.0-40.0)   04/01/19  07:40    


 


Mono % (Auto)  11.6 % (0.0-10.0)  H  04/01/19  07:40    


 


Eos % (Auto)  0.9 % (0.0-4.0)   04/01/19  07:40    


 


Baso % (Auto)  0.5 % (0.0-2.0)   04/01/19  07:40    


 


Neut # (Auto)  5.4 K/uL (1.8-7.0)   04/01/19  07:40    


 


Lymph # (Auto)  2.0 K/uL (1.0-4.3)   04/01/19  07:40    


 


Mono # (Auto)  1.0 K/uL (0.0-0.8)  H  04/01/19  07:40    


 


Eos # (Auto)  0.1 K/uL (0.0-0.7)   04/01/19  07:40    


 


Baso # (Auto)  0.0 K/uL (0.0-0.2)   04/01/19  07:40    


 


D-Dimer, Quantitative  621 ng/mlDDU (0-243)  H  03/31/19  11:32    


 


Sodium  136 mmol/L (132-148)   04/01/19  07:40    


 


Potassium  4.0 mmol/L (3.6-5.2)   04/01/19  07:40    


 


Chloride  108 mmol/L ()  H  04/01/19  07:40    


 


Carbon Dioxide  20 mmol/L (22-30)  L  04/01/19  07:40    


 


Anion Gap  13  (10-20)   04/01/19  07:40    


 


BUN  11 mg/dL (7-17)   04/01/19  07:40    


 


Creatinine  0.7 mg/dL (0.7-1.2)   04/01/19  07:40    


 


Est GFR ( Amer)  > 60   04/01/19  07:40    


 


Est GFR (Non-Af Amer)  > 60   04/01/19  07:40    


 


Random Glucose  116 mg/dL ()  H D 04/01/19  07:40    


 


Hemoglobin A1c  6.0 % (4.2-6.5)   03/31/19  15:36    


 


Calcium  9.2 mg/dl (8.6-10.4)   04/01/19  07:40    


 


Total Bilirubin  0.5 mg/dL (0.2-1.3)   04/01/19  07:40    


 


AST  15 U/L (14-36)   04/01/19  07:40    


 


ALT  10 U/L (9-52)   04/01/19  07:40    


 


Alkaline Phosphatase  87 U/L ()   04/01/19  07:40    


 


Total Creatine Kinase  62 U/L ()   04/01/19  03:24    


 


CK-MB (Mass)  0.33 ng/mL (0.0-3.38)   04/01/19  03:24    


 


Troponin I  < 0.0120 ng/mL (0.00-0.120)   04/01/19  03:24    


 


NT-Pro-B Natriuret Pep  295 pg/mL (0-900)   03/31/19  14:18    


 


Total Protein  6.9 g/dL (6.3-8.3)   04/01/19  07:40    


 


Albumin  3.9 g/dL (3.5-5.0)   04/01/19  07:40    


 


Globulin  3.0 gm/dL (2.2-3.9)   04/01/19  07:40    


 


Albumin/Globulin Ratio  1.3  (1.0-2.1)   04/01/19  07:40    


 


Triglycerides  97 mg/dL (0-149)   03/31/19  15:36    


 


Cholesterol  170 mg/dL (0-199)   03/31/19  15:36    


 


LDL Cholesterol Direct  103 mg/dL (0-129)   03/31/19  15:36    


 


HDL Cholesterol  56 mg/dL (30-70)   03/31/19  15:36    


 


Lipase  65 U/L ()   03/31/19  11:32    


 


TSH 3rd Generation  1.08 mIU/L (0.46-4.68)   03/31/19  15:36    


 


Urine Color  Yellow  (YELLOW)   03/31/19  11:32    


 


Urine Clarity  Hazy  (Clear)   03/31/19  11:32    


 


Urine pH  5.0  (5.0-8.0)   03/31/19  11:32    


 


Ur Specific Gravity  1.023  (1.003-1.030)   03/31/19  11:32    


 


Urine Protein  Negative mg/dL (NEGATIVE)   03/31/19  11:32    


 


Urine Glucose (UA)  Normal mg/dL (Normal)   03/31/19  11:32    


 


Urine Ketones  Trace mg/dL (NEGATIVE)   03/31/19  11:32    


 


Urine Blood  Negative  (NEGATIVE)   03/31/19  11:32    


 


Urine Nitrate  Negative  (NEGATIVE)   03/31/19  11:32    


 


Urine Bilirubin  Negative  (NEGATIVE)   03/31/19  11:32    


 


Urine Urobilinogen  2.0 mg/dL (0.2-1.0)  H  03/31/19  11:32    


 


Ur Leukocyte Esterase  Trace Elle/uL (Negative)   03/31/19  11:32    


 


Urine WBC (Auto)  1 /hpf (0-5)   03/31/19  11:32    


 


Urine RBC (Auto)  1 /hpf (0-3)   03/31/19  11:32    


 


Ur Squamous Epith Cells  1 /hpf (0-5)   03/31/19  11:32    














Attending/Attestation





- Attestation


I have personally seen and examined this patient.: Yes


I have fully participated in the care of the patient.: Yes


I have reviewed all pertinent clinical information, including history, physical 

exam and plan: Yes


Notes (Text): 





04/01/19 17:35


Medical attending: Patient was seen and examined by me with the medical resid

ents


The patient was not in any acute distress when I came and saw with the residents


The pain that the patient reported previously had resolved. 


Her CTA was negative for PE however it did suggest a possible PNA and she was 

already placed on IV Avelox


Her cardiac enzymes were negative x 3


The pain was reportedly reproducible yesterday with palpation however today the 

pain is not reproducible. She does not have pain.


The patient underwent 2D echo today, she should return for the results





Noel Hyamn

## 2019-04-01 NOTE — CP.PCM.PN
Subjective





- Date & Time of Evaluation


Date of Evaluation: 04/01/19


Time of Evaluation: 08:41





- Subjective


Subjective: 





Medicine progress note for Dr. Hyman





Pt seen and examined at bedside. Pt denies any complaints at this time. Reports 

resolution of left rib pain. Denies fever, chills, chest pain, sob, 

palpitations, abdominal pain, n/v/d, headache, dizziness, lightheadedness. 





Objective





- Vital Signs/Intake and Output


Vital Signs (last 24 hours): 


                                        











Temp Pulse Resp BP Pulse Ox


 


 98.1 F   83   20   99/66 L  92 L


 


 04/01/19 08:00  04/01/19 08:00  04/01/19 08:00  04/01/19 08:00  04/01/19 08:00











- Medications


Medications: 


                               Current Medications





Acetaminophen (Tylenol 325mg Tab)  650 mg PO Q6 PRN


   PRN Reason: Fever >100.4 F


Albuterol/Ipratropium (Duoneb 3 Mg/0.5 Mg (3 Ml) Ud)  3 ml INH RQ6 CHRISSY


   Last Admin: 04/01/19 07:47 Dose:  3 ml


Aspirin (Aspirin Chewable)  81 mg PO DAILY CHRISSY


Heparin Sodium (Porcine) (Heparin)  5,000 units SC Q12 CHRISSY


   Last Admin: 03/31/19 21:09 Dose:  5,000 units


Moxifloxacin HCl (Avelox Iv 400mg/250ml Ns)  400 mg in 250 mls @ 167 mls/hr IVPB

DAILY CHRISSY; Protocol


Nicotine (Nicoderm Cq)  1 patch TD DAILY CHRISSY


   Last Admin: 03/31/19 21:09 Dose:  1 patch


Pneumococcal Polyvalent Vaccine (Pneumovax 23 Vaccine)  0.5 ml IM .ONCE ONE


   Stop: 04/03/19 10:01











- Labs


Labs: 


                                        





                                 04/01/19 07:40 





                                 04/01/19 07:40 











- Additional Findings


Additional findings: 





- Head Exam


Head Exam: ATRAUMATIC, NORMAL INSPECTION, NORMOCEPHALIC





- Eye Exam


Eye Exam: EOMI, Normal appearance.





- ENT Exam


ENT Exam: Mucous Membranes Moist





- Respiratory Exam


Respiratory Exam: Clear to Auscultation Bilateral, NORMAL BREATHING PATTERN, 

Decreased breath sounds.  absent: Prolonged Expiratory Phase, Respiratory 

Distress, Rhonchi, Wheezes, Rales





- Cardiovascular Exam


Cardiovascular Exam: REGULAR RHYTHM, +S1, +S2.  absent: Rubs





- GI/Abdominal Exam


GI & Abdominal Exam: Normal Bowel Sounds, Soft.  absent: Tenderness





- Back Exam


Back exam: NORMAL INSPECTION.  absent: CVA tenderness (L), CVA tenderness (R), 

paraspinal tenderness





- Neurological Exam


Neurological exam: Alert, CN II-XII Intact, Oriented x3





- Psychiatric Exam


Psychiatric exam: Normal Affect, Normal Mood





- Skin


Skin Exam: Dry, Intact, Normal Color


(+) approximately 4 cm mole to the left lateral upper neck with stalk, 

nontender, no signs of infection, homogenous color; chronic





Assessment and Plan





- Assessment and Plan (Free Text)


Assessment: 








58 year old female with a past medical history of pulmonic stenosis, fibroids 

and ovarian cyst presents with chest pain for the past three days.


Plan: 





Chest pain, resolved


Likely musculoskeletal as responded well to Toradol and Tylenol


EKG:Normal sinus rhythm at 86bpm, incomplete RBBB, borderline EKG


Full dose ASA given on admission


Troponin negative x3


F/u Echocardiogram





Pneumonia


Afebrile, no leukocytosis, no tachycardia


CXR:Prominent reticular opacities at the lung bases more on the left.


CTA:No evidence of acute pulmonary embolus.


Markedly enlarged main pulmonary artery is again noted.


Cardiomegaly and enlargement of the right heart.


Airspace consolidation at the left lower lobe associated with small left pleural

effusion suspicious for pneumonia.


   Medications:


   Moxifloxacin 400mg IVPB DAILY


   Duonebs





Elevated d-dimer


d-dimer 621 on admission


CTA:CTA:No evidence of acute pulmonary embolus.


Markedly enlarged main pulmonary artery is again noted.


Cardiomegaly and enlargement of the right heart.


Airspace consolidation at the left lower lobe associated with small left pleural

effusion suspicious for pneumonia.





Impaired Glucose Tolerance


Hemoglobin A1C is 6.0


TG/CHL/LDL/HDL is 97/170/103/56


TSH is 1.08


Advised on diet and exercise modifications.





Nicotine dependance


Nicotine patch


Pt indicates she is interested in quitting, provided with pamphlets 





Active mole on neck


-Will refer to [facility] or [facility] for further workup.





Enlarged pulmonary artery


-Echocardiogram ordered. Will f/u with results.








ppx


-Heparin


-GI ppx not indicated at this time.

## 2019-04-02 NOTE — CARD
--------------- APPROVED REPORT --------------





Date of service: 04/01/2019



EXAM: Two-dimensional and M-mode echocardiogram with Doppler and 

color Doppler.



Other Information 

Quality : GoodRhythm : 



INDICATION

LV Function:Systolic HX of Pulmonary Stenosis



2D DIMENSIONS 

IVSd1.0   (0.7-1.1cm)LVDd4.0   (3.9-5.9cm)

PWd0.7   (0.7-1.1cm)LA [id number]   (18-58mL)

LVEF (Bustos's)65.86 %IVC0.00 cm



M-Mode DIMENSIONS 

RVDd2.31   (2.1-3.2cm)Left Atrium (MM)3.30   (2.5-4.0cm)

IVSd0.82   (0.7-1.1cm)Aortic Root3.79   (2.2-3.7cm)

LVDd4.40   (4.0-5.6cm)Aortic Cusp Exc.2.28   (1.5-2.0cm)

PWd1.00   (0.7-1.1cm)FS (%) 17   %

LVDs3.67   (2.0-3.8cm)TAPSE13.81 cm

LVEF (%)65   (>50%)



Mitral Valve

MV E Rubvvjfz17.2cm/sMV A Pfxsidbt52.2cm/sE/A ratio0.8



TDI

Lateral E' Peak V14.93cm/sMedial E' Peak V9.00cm/sE/Lateral E'4.8

E/Medial E'7.9



Pulmonary Valve

PV Peak Lilapwbj462.7cm/sPV Peak Grad.11mmHg



Tricuspid Valve

TR Peak Hhhveerd638be/sTR Peak Gr.[id number]



<Conclusion>

Left ventricle: thickness: normal; size: normal; overall ejection 

fraction: 65%: 

diastolic filling pressures: normal



Mitral valve: annulus: normal: leaflets: normal: excursion: normal; 

no significant trans-mitral gradient: mild incompetence: left atrium: 

normal

Aortic valve: leaflets: normal: excursion: normal; no significant 

trans-aortic gradient:no incompetence: aortic root: dilated

Right sided Structures: Pulmonary valve: normal; mild incompetence; 

Tricuspid valve: normal; mild incompetence:

Intra-cardiac hemodynamics: pulmonary systolic pressures: 38 mmHg; 

central venous pressures: normal

No pericardial effusion

## 2019-04-02 NOTE — CARD
--------------- APPROVED REPORT --------------





Date of service: 03/31/2019



EKG Measurement

Heart [id number]

[id number]

[id number]

[id number]

[id number]



<Conclusion>

Normal sinus rhythm

Incomplete right bundle branch block

Borderline ECG